# Patient Record
Sex: FEMALE | Race: BLACK OR AFRICAN AMERICAN | NOT HISPANIC OR LATINO | URBAN - METROPOLITAN AREA
[De-identification: names, ages, dates, MRNs, and addresses within clinical notes are randomized per-mention and may not be internally consistent; named-entity substitution may affect disease eponyms.]

---

## 2023-06-15 ENCOUNTER — OUTPATIENT (OUTPATIENT)
Dept: OUTPATIENT SERVICES | Facility: HOSPITAL | Age: 23
LOS: 1 days | End: 2023-06-15
Payer: SELF-PAY

## 2023-06-15 ENCOUNTER — EMERGENCY (EMERGENCY)
Facility: HOSPITAL | Age: 23
LOS: 0 days | Discharge: HOME | End: 2023-06-15
Attending: EMERGENCY MEDICINE
Payer: MEDICAID

## 2023-06-15 VITALS
OXYGEN SATURATION: 98 % | SYSTOLIC BLOOD PRESSURE: 102 MMHG | HEART RATE: 75 BPM | TEMPERATURE: 99 F | HEIGHT: 61 IN | WEIGHT: 110.01 LBS | RESPIRATION RATE: 16 BRPM | DIASTOLIC BLOOD PRESSURE: 57 MMHG

## 2023-06-15 DIAGNOSIS — K02.9 DENTAL CARIES, UNSPECIFIED: ICD-10-CM

## 2023-06-15 DIAGNOSIS — K08.89 OTHER SPECIFIED DISORDERS OF TEETH AND SUPPORTING STRUCTURES: ICD-10-CM

## 2023-06-15 DIAGNOSIS — K01.1 IMPACTED TEETH: ICD-10-CM

## 2023-06-15 DIAGNOSIS — K01.0 EMBEDDED TEETH: ICD-10-CM

## 2023-06-15 PROCEDURE — 99282 EMERGENCY DEPT VISIT SF MDM: CPT

## 2023-06-15 PROCEDURE — 99283 EMERGENCY DEPT VISIT LOW MDM: CPT

## 2023-06-15 PROCEDURE — D0330: CPT

## 2023-06-15 PROCEDURE — D0140: CPT

## 2023-06-15 RX ORDER — IBUPROFEN 200 MG
600 TABLET ORAL ONCE
Refills: 0 | Status: COMPLETED | OUTPATIENT
Start: 2023-06-15 | End: 2023-06-15

## 2023-06-15 NOTE — ED PROVIDER NOTE - ATTENDING APP SHARED VISIT CONTRIBUTION OF CARE
23 yo f with R lower dental pain and cheek abscess.  pt says went to [place] ED yesterday and was told had a cheek/dental abscess, given rx for amox and motrin, but didn't fill.  pt says she is here because she doesn't have a dentist.  exam: R impacted tooth #32, no dental abscess, no buccal mucosa abscess imp: pt with impacted wisdom tooth, no signs of abscess.  pt is advised to fill her paper scripts for amox and f/ with dental clinic outpt.

## 2023-06-15 NOTE — ED ADULT TRIAGE NOTE - PATIENT ON (OXYGEN DELIVERY METHOD)
room air Laura La Roche-Posay Products: No Action 1: Continue Treatment 1: spironolactone Sig For Treatment 1 (If Needed): 50mg twice daily Detail Level: Zone

## 2023-06-15 NOTE — ED PROVIDER NOTE - PATIENT PORTAL LINK FT
You can access the FollowMyHealth Patient Portal offered by [facility] by registering at the following website: [email or web address]. By joining [facility]’s FollowMyHealth portal, you will also be able to view your health information using other applications (apps) compatible with our system.

## 2023-06-15 NOTE — ED PROVIDER NOTE - CLINICAL SUMMARY MEDICAL DECISION MAKING FREE TEXT BOX
pt with impacted wisdom tooth, no signs of abscess.  pt is advised to fill her paper scripts for amox and f/ with dental clinic outpt.

## 2023-06-15 NOTE — ED ADULT NURSE NOTE - NSFALLUNIVINTERV_ED_ALL_ED
Bed/Stretcher in lowest position, wheels locked, appropriate side rails in place/Call bell, personal items and telephone in reach/Instruct patient to call for assistance before getting out of bed/chair/stretcher/Non-slip footwear applied when patient is off stretcher/[place] to call system/Physically safe environment - no spills, clutter or unnecessary equipment/Purposeful proactive rounding/Room/bathroom lighting operational, light cord in reach

## 2023-06-15 NOTE — ED PROVIDER NOTE - NSFOLLOWUPCLINICS_GEN_ALL_ED_FT
[facility]  Dental  [address] [place] [id number]  Phone: [phone]  Fax:

## 2023-06-15 NOTE — ED PROVIDER NOTE - OBJECTIVE STATEMENT
22-year-old female no past medical history presents to the ED complaining of right lower dental pain and swelling for a couple of days.  Patient states was seen at [facility] last night and given prescription for amoxicillin and Motrin.  Patient denies any fever, chills or difficulty swallowing.